# Patient Record
Sex: FEMALE | Race: BLACK OR AFRICAN AMERICAN | NOT HISPANIC OR LATINO | Employment: OTHER | URBAN - METROPOLITAN AREA
[De-identification: names, ages, dates, MRNs, and addresses within clinical notes are randomized per-mention and may not be internally consistent; named-entity substitution may affect disease eponyms.]

---

## 2024-11-14 ENCOUNTER — HOSPITAL ENCOUNTER (EMERGENCY)
Facility: HOSPITAL | Age: 59
Discharge: HOME | End: 2024-11-14
Attending: EMERGENCY MEDICINE
Payer: MEDICAID

## 2024-11-14 VITALS
DIASTOLIC BLOOD PRESSURE: 69 MMHG | HEIGHT: 67 IN | HEART RATE: 86 BPM | RESPIRATION RATE: 18 BRPM | OXYGEN SATURATION: 97 % | SYSTOLIC BLOOD PRESSURE: 101 MMHG | WEIGHT: 221 LBS | TEMPERATURE: 97.4 F | BODY MASS INDEX: 34.69 KG/M2

## 2024-11-14 DIAGNOSIS — F10.920 ALCOHOLIC INTOXICATION WITHOUT COMPLICATION (CMS-HCC): Primary | ICD-10-CM

## 2024-11-14 LAB — GLUCOSE BLD MANUAL STRIP-MCNC: 135 MG/DL (ref 74–99)

## 2024-11-14 PROCEDURE — 99283 EMERGENCY DEPT VISIT LOW MDM: CPT

## 2024-11-14 PROCEDURE — 82947 ASSAY GLUCOSE BLOOD QUANT: CPT

## 2024-11-14 PROCEDURE — 99283 EMERGENCY DEPT VISIT LOW MDM: CPT | Performed by: EMERGENCY MEDICINE

## 2024-11-14 ASSESSMENT — COLUMBIA-SUICIDE SEVERITY RATING SCALE - C-SSRS
2. HAVE YOU ACTUALLY HAD ANY THOUGHTS OF KILLING YOURSELF?: NO
6. HAVE YOU EVER DONE ANYTHING, STARTED TO DO ANYTHING, OR PREPARED TO DO ANYTHING TO END YOUR LIFE?: NO
1. IN THE PAST MONTH, HAVE YOU WISHED YOU WERE DEAD OR WISHED YOU COULD GO TO SLEEP AND NOT WAKE UP?: NO

## 2024-11-14 ASSESSMENT — PAIN SCALES - GENERAL: PAINLEVEL_OUTOF10: 0 - NO PAIN

## 2024-11-14 ASSESSMENT — PAIN - FUNCTIONAL ASSESSMENT: PAIN_FUNCTIONAL_ASSESSMENT: 0-10

## 2024-11-14 NOTE — ED TRIAGE NOTES
Patient BIB EMS after she was found in her wheelchair slumped over; patient states she was at a family member's house and drank 3 beers, of a brand she isn't used to and then was sitting near the bus stop because she couldn't figure out the bus system to go home; patient denies any illicit drug use today, she was given 4mg of intranasal Narcan by EMS as her pupils were pinpoint; on arrival patient is alert, falls asleep if not stimulated, but easily arousable; answers all questions appropriately; she does have significantly slurred speech, states this is from her drinking, speech was normal before she was drinking; Negative for all other NIH symptoms; bg 135 in triage; no signs of respiratory distress or acute hypoxia while on room air; A&O x4; PERRLA

## 2024-11-15 NOTE — ED PROVIDER NOTES
HPI   Chief Complaint   Patient presents with    Alcohol Intoxication       HPI and ED course  Ms. Alexei Burt is a 58 y/o female with a past medical history of anxiety presenting with altered mental status. Patient spent the evening at her cousin's, where she drank 2 large malt beers. She denies any other drug use. On her way home, she was found at the bus stop passed out in her wheelchair. EMS was called and noted pinpoint pupils and slurred speech on exam and she was given narcan en route to the ED.   In the ED, she was asleep but arousable with stimulation. She reports feeling drowsy and altered when at her cousins house, with difficulty understanding others and others having a difficulty understanding her. She is now feeling more alert, and she feels her speech is improving. Her pupils are pinpoint and speech slurred. There are no obvious signs of trauma. The patient denies any falls/trauma, no vision changes, headaches, weakness, nausea, vomiting. She reports normally drinking about 3 drinks per week and denies smoking or other illicit drug use.       Patient History   No past medical history on file.  No past surgical history on file.  No family history on file.  Social History     Tobacco Use    Smoking status: Not on file    Smokeless tobacco: Not on file   Substance Use Topics    Alcohol use: Not on file    Drug use: Not on file       Physical Exam   ED Triage Vitals [11/14/24 1855]   Temperature Heart Rate Respirations BP   36.3 °C (97.4 °F) 75 16 (!) 140/93      Pulse Ox Temp Source Heart Rate Source Patient Position   96 % Oral -- --      BP Location FiO2 (%)     -- --       Physical Exam  Constitutional:       General: She is not in acute distress.     Appearance: Normal appearance.   HENT:      Head: Normocephalic and atraumatic.      Nose: Nose normal.   Eyes:      Conjunctiva/sclera: Conjunctivae normal.      Pupils: Pupils are equal, round, and reactive to light.      Comments: Pinpoint pupils  bilaterally   Cardiovascular:      Rate and Rhythm: Normal rate and regular rhythm.      Heart sounds: Normal heart sounds. No murmur heard.     No gallop.   Pulmonary:      Effort: Pulmonary effort is normal. No respiratory distress.      Breath sounds: Normal breath sounds. No wheezing or rales.   Abdominal:      General: Abdomen is flat.      Palpations: Abdomen is soft.      Tenderness: There is no abdominal tenderness.   Skin:     General: Skin is warm and dry.   Neurological:      General: No focal deficit present.      Mental Status: She is alert and oriented to person, place, and time. Mental status is at baseline.      Motor: No weakness.   Psychiatric:         Mood and Affect: Mood normal.         Speech: Speech is slurred.         Behavior: Behavior normal.         Thought Content: Thought content normal.         Judgment: Judgment normal.          No data recorded     [place] Coma Scale Score: 15 (11/14/24 1857 : Hansa Thomas RN)                     Medical Decision Making  Ms Alexei Burt is a 58 y/o presenting with altered mental status. Based on her history and exam, her presentation is most likely due to alcohol intoxication. Differential diagnosis includes stroke or intracranial bleed, but this is less likely as there was no history of trauma or falls, and no focal neurological deficits on exam. Opioid intoxication is possible considering her pinpoint pupils and somnolence, narcan was given and will continue to observe. Plan to obtain POCT glucose, EKG, and observe her respiratory status.         Procedure  Procedures     Jenny Randolph  11/14/24 1947